# Patient Record
(demographics unavailable — no encounter records)

---

## 2025-03-11 NOTE — HISTORY OF PRESENT ILLNESS
[de-identified] : 70 year old male presents today for initial consultation for elevated ferritin, referred by Dr. Lin.  Labs dated 2024 shows a ferritin of 849, total iron 118, iron sat 41%.  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      Ferritin Levels: 2024- 849 2023- 534 2022-   Social Hx- Social ETOH use  Former smoker- quit 8 years ago, smoke 1 PPD x 45 years Denies current illicit drug use Retired  Maried with 2 adopted children   Family Hx- Father with met RCC  in his 70's  Health Maintence- [facility]-  Prostate exam 2024  [de-identified] : Patient is here for follow up for HFE, elevated ferritin. Patient overall feels well except for some minor fatigue.

## 2025-03-11 NOTE — CONSULT LETTER
[Dear  ___] : Dear  [unfilled], [Consult Letter:] : I had the pleasure of evaluating your patient, [unfilled]. [Please see my note below.] : Please see my note below. [Consult Closing:] : Thank you very much for allowing me to participate in the care of this patient.  If you have any questions, please do not hesitate to contact me. [Sincerely,] : Sincerely, [FreeTextEntry3] : Joan Nettles MD [facility]

## 2025-03-11 NOTE — ASSESSMENT
[Patient/Caregiver unclear of wishes] : Patient/Caregiver unclear of wishes [FreeTextEntry1] : 70-year-old male presents today for initial consultation for elevated ferritin, referred by Dr. Lin.  Labs dated Jan 2024 shows a ferritin of 849, total iron 118, iron sat 41%.  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  Reviewed with patient hemochromatosis, increased iron absorption but usually not with H63D heterozygote mutation. Patient admits to 3-4 drinks per day, no previous imaging of the liver.  MRI to evaluate iron stores ordered.   #The patient was offered screening CT scan in view of history of smoking. The patient participated in shared- decision making session where the benefits, limitations and potential harms were discussed.  The patient was determined to be eligible for screening CT based on age, calculation of smoking pack- years.  The patient was informed of the importance of adherence to annual screening, impact of comorbidities. The patient has no sign of lung cancer.  The patient is over 30ppy. The patient is a former smoker.  Up to 4 mm lung nodules.   Small cluster of left lower lobe tree-in-bud nodules compatible with small airway disease of likely infectious or inflammatory etiologies.  # Liver iron stores elevated, Ferritin 721- schedule phlebotomy x 4 Reviewed MRI - Hepatic Iron concentration: 42 ?mol/g or 2.3 mg/g, compatible with mild iron deposition. No steatosis. Reviewed with patient to decrease ETOH intake.   Ferritin 481- phlebotomy x 2 - rpt labs in 6 weeks   case and mgmt discussed with Dr. Nettles  [AdvancecareDate] : 03/12/2024

## 2025-03-11 NOTE — HISTORY OF PRESENT ILLNESS
[de-identified] : 70 year old male presents today for initial consultation for elevated ferritin, referred by Dr. Lin.  Labs dated 2024 shows a ferritin of 849, total iron 118, iron sat 41%.  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      Ferritin Levels: 2024- 849 2023- 534 2022-   Social Hx- Social ETOH use  Former smoker- quit 8 years ago, smoke 1 PPD x 45 years Denies current illicit drug use Retired  Maried with 2 adopted children   Family Hx- Father with met RCC  in his 70's  Health Maintence- [facility]-  Prostate exam 2024  [de-identified] : Patient is here for follow up for HFE, elevated ferritin. Patient overall feels well except for some minor fatigue.

## 2025-03-11 NOTE — CONSULT LETTER
[Dear  ___] : Dear  [unfilled], [Consult Letter:] : I had the pleasure of evaluating your patient, [unfilled]. [Please see my note below.] : Please see my note below. [Consult Closing:] : Thank you very much for allowing me to participate in the care of this patient.  If you have any questions, please do not hesitate to contact me. [Sincerely,] : Sincerely, [FreeTextEntry3] : Joan Nettles MD [facility]

## 2025-03-11 NOTE — CONSULT LETTER
[Dear  ___] : Dear  [unfilled], [Consult Letter:] : I had the pleasure of evaluating your patient, [unfilled]. [Please see my note below.] : Please see my note below. [Consult Closing:] : Thank you very much for allowing me to participate in the care of this patient.  If you have any questions, please do not hesitate to contact me. [Sincerely,] : Sincerely, [FreeTextEntry3] : Joan Nettles MD [facility]

## 2025-03-11 NOTE — HISTORY OF PRESENT ILLNESS
[de-identified] : 70 year old male presents today for initial consultation for elevated ferritin, referred by Dr. Lin.  Labs dated 2024 shows a ferritin of 849, total iron 118, iron sat 41%.  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      Ferritin Levels: 2024- 849 2023- 534 2022-   Social Hx- Social ETOH use  Former smoker- quit 8 years ago, smoke 1 PPD x 45 years Denies current illicit drug use Retired  Maried with 2 adopted children   Family Hx- Father with met RCC  in his 70's  Health Maintence- [facility]-  Prostate exam 2024  [de-identified] : Patient is here for follow up for HFE, elevated ferritin. Patient overall feels well except for some minor fatigue.

## 2025-04-22 NOTE — CONSULT LETTER
[Dear  ___] : Dear  [unfilled], [Consult Letter:] : I had the pleasure of evaluating your patient, [unfilled]. [Please see my note below.] : Please see my note below. [Consult Closing:] : Thank you very much for allowing me to participate in the care of this patient.  If you have any questions, please do not hesitate to contact me. [Sincerely,] : Sincerely, [FreeTextEntry3] : Joan Nettles MD [facility]

## 2025-04-22 NOTE — ASSESSMENT
[FreeTextEntry1] : 70-year-old male presents today for initial consultation for elevated ferritin, referred by Dr. Lin.  Labs dated Jan 2024 shows a ferritin of 849, total iron 118, iron sat 41%.  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  Reviewed with patient hemochromatosis, increased iron absorption but usually not with H63D heterozygote mutation. Patient admits to 3-4 drinks per day, no previous imaging of the liver.  MRI to evaluate iron stores ordered.   #The patient was offered screening CT scan in view of history of smoking. The patient participated in shared- decision making session where the benefits, limitations and potential harms were discussed.  The patient was determined to be eligible for screening CT based on age, calculation of smoking pack- years.  The patient was informed of the importance of adherence to annual screening, impact of comorbidities. The patient has no sign of lung cancer.  The patient is over 30ppy. The patient is a former smoker.  Up to 4 mm lung nodules.   Small cluster of left lower lobe tree-in-bud nodules compatible with small airway disease of likely infectious or inflammatory etiologies.  # Liver iron stores elevated, Ferritin 721- schedule phlebotomy x 4 Reviewed MRI - Hepatic Iron concentration: 42 ?mol/g or 2.3 mg/g, compatible with mild iron deposition. No steatosis. Reviewed with patient to decrease ETOH intake.   Ferritin 481- s/p phlebotomy x 2 hgb decline 13.3 check ferritin today   case and mgmt discussed with Dr. Nettles- return in 3 months - reg labs and irons  [Patient/Caregiver unclear of wishes] : Patient/Caregiver unclear of wishes [AdvancecareDate] : 03/12/2024

## 2025-04-22 NOTE — HISTORY OF PRESENT ILLNESS
[de-identified] : 70 year old male presents today for initial consultation for elevated ferritin, referred by Dr. Lin.  Labs dated 2024 shows a ferritin of 849, total iron 118, iron sat 41%.  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      Ferritin Levels: 2024- 849 2023- 534 2022-   Social Hx- Social ETOH use  Former smoker- quit 8 years ago, smoke 1 PPD x 45 years Denies current illicit drug use Retired  Maried with 2 adopted children   Family Hx- Father with met RCC  in his 70's  Health Maintence- [facility]-  Prostate exam 2024  [de-identified] : Patient is here for follow up for HFE, elevated ferritin. Patient overall feels well except for some minor fatigue. S/p phlebotomy x 2

## 2025-07-21 NOTE — BEGINNING OF VISIT
[0] : 2) Feeling down, depressed, or hopeless: Not at all (0) [PHQ-2 Negative] : PHQ-2 Negative [Former] : Former [20 or more] : 20 or more [< 15 Years] : < 15 Years [Date Discussed (MM/DD/YY): ___] : Discussed: [unfilled] [Patient/Caregiver unclear of wishes] : Patient/Caregiver unclear of wishes [[id number]] : 0

## 2025-07-21 NOTE — HISTORY OF PRESENT ILLNESS
[de-identified] : 70 year old male presents today for initial consultation for elevated ferritin, referred by Dr. Lin.  Labs dated 2024 shows a ferritin of 849, total iron 118, iron sat 41%.  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      Ferritin Levels: 2024- 849 2023- 534 2022-   Social Hx- Social ETOH use  Former smoker- quit 8 years ago, smoke 1 PPD x 45 years Denies current illicit drug use Retired  Maried with 2 adopted children   Family Hx- Father with met RCC  in his 70's  Health Maintence- [facility]-  Prostate exam 2024  [de-identified] : Patient is here for follow up for HFE, elevated ferritin.  Patient overall feels well except for some minor fatigue.  Last TP was 2-3 months ago Lab work performed here on 7/7/25, discussing results today

## 2025-07-21 NOTE — CONSULT LETTER
[Dear  ___] : Dear  [unfilled], [Consult Letter:] : I had the pleasure of evaluating your patient, [unfilled]. [Please see my note below.] : Please see my note below. [Consult Closing:] : Thank you very much for allowing me to participate in the care of this patient.  If you have any questions, please do not hesitate to contact me. [Sincerely,] : Sincerely, [FreeTextEntry3] : Joan Nettles MD [facility]

## 2025-07-21 NOTE — ASSESSMENT
[Patient/Caregiver unclear of wishes] : Patient/Caregiver unclear of wishes [Full Code] : full code [FreeTextEntry1] : 70-year-old male presents today for initial consultation for elevated ferritin, referred by Dr. Lin.  Labs dated Jan 2024 shows a ferritin of 849, total iron 118, iron sat 41%.  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  Reviewed with patient hemochromatosis, increased iron absorption but usually not with H63D heterozygote mutation. Patient admits to 3-4 drinks per day, no previous imaging of the liver.  MRI to evaluate iron stores ordered.   #The patient was offered screening CT scan in view of history of smoking. The patient participated in shared- decision making session where the benefits, limitations and potential harms were discussed.  The patient was determined to be eligible for screening CT based on age, calculation of smoking pack- years.  The patient was informed of the importance of adherence to annual screening, impact of comorbidities. The patient has no sign of lung cancer.  The patient is over 30ppy. The patient is a former smoker.  Up to 4 mm lung nodules.   Small cluster of left lower lobe tree-in-bud nodules compatible with small airway disease of likely infectious or inflammatory etiologies. - due for CT chest   # Liver iron stores elevated, Ferritin 721- schedule phlebotomy x 4 Reviewed MRI - Hepatic Iron concentration: 42 ?mol/g or 2.3 mg/g, compatible with mild iron deposition. No steatosis. Reviewed with patient to decrease ETOH intake.  Dexa ordered Ferritin 481> 140 Phlebotomy today  return in 3 months - reg labs and irons  [AdvancecareDate] : 07/21/25

## 2025-07-21 NOTE — CONSULT LETTER
[Dear  ___] : Dear  [unfilled], [Consult Letter:] : I had the pleasure of evaluating your patient, [unfilled]. [Please see my note below.] : Please see my note below. [Consult Closing:] : Thank you very much for allowing me to participate in the care of this patient.  If you have any questions, please do not hesitate to contact me. [Sincerely,] : Sincerely, [FreeTextEntry3] : Joan Nettles MD [facility]

## 2025-07-21 NOTE — CONSULT LETTER
[Dear  ___] : Dear  [unfilled], [Consult Letter:] : I had the pleasure of evaluating your patient, [unfilled]. [Please see my note below.] : Please see my note below. [Consult Closing:] : Thank you very much for allowing me to participate in the care of this patient.  If you have any questions, please do not hesitate to contact me. [Sincerely,] : Sincerely, [FreeTextEntry3] : Joan Nettles MD [facility]

## 2025-07-21 NOTE — BEGINNING OF VISIT
[0] : 2) Feeling down, depressed, or hopeless: Not at all (0) [PHQ-2 Negative] : PHQ-2 Negative [Former] : Former [20 or more] : 20 or more [< 15 Years] : < 15 Years [Date Discussed (MM/DD/YY): ___] : Discussed: [unfilled] [Patient/Caregiver unclear of wishes] : Patient/Caregiver unclear of wishes [[id number]] : 0

## 2025-07-21 NOTE — HISTORY OF PRESENT ILLNESS
[de-identified] : 70 year old male presents today for initial consultation for elevated ferritin, referred by Dr. Lin.  Labs dated 2024 shows a ferritin of 849, total iron 118, iron sat 41%.  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      Ferritin Levels: 2024- 849 2023- 534 2022-   Social Hx- Social ETOH use  Former smoker- quit 8 years ago, smoke 1 PPD x 45 years Denies current illicit drug use Retired  Maried with 2 adopted children   Family Hx- Father with met RCC  in his 70's  Health Maintence- [facility]-  Prostate exam 2024  [de-identified] : Patient is here for follow up for HFE, elevated ferritin.  Patient overall feels well except for some minor fatigue.  Last TP was 2-3 months ago Lab work performed here on 7/7/25, discussing results today

## 2025-07-21 NOTE — HISTORY OF PRESENT ILLNESS
[de-identified] : 70 year old male presents today for initial consultation for elevated ferritin, referred by Dr. Lin.  Labs dated 2024 shows a ferritin of 849, total iron 118, iron sat 41%.  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      Ferritin Levels: 2024- 849 2023- 534 2022-   Social Hx- Social ETOH use  Former smoker- quit 8 years ago, smoke 1 PPD x 45 years Denies current illicit drug use Retired  Maried with 2 adopted children   Family Hx- Father with met RCC  in his 70's  Health Maintence- [facility]-  Prostate exam 2024  [de-identified] : Patient is here for follow up for HFE, elevated ferritin.  Patient overall feels well except for some minor fatigue.  Last TP was 2-3 months ago Lab work performed here on 7/7/25, discussing results today

## 2025-07-21 NOTE — BEGINNING OF VISIT
[0] : 2) Feeling down, depressed, or hopeless: Not at all (0) [PHQ-2 Negative] : PHQ-2 Negative [Former] : Former [20 or more] : 20 or more [< 15 Years] : < 15 Years [Date Discussed (MM/DD/YY): ___] : Discussed: [unfilled] [Patient/Caregiver unclear of wishes] : Patient/Caregiver unclear of wishes [[id number]] : 0

## 2025-07-21 NOTE — HISTORY OF PRESENT ILLNESS
[de-identified] : 70 year old male presents today for initial consultation for elevated ferritin, referred by Dr. Lin.  Labs dated 2024 shows a ferritin of 849, total iron 118, iron sat 41%.  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      Ferritin Levels: 2024- 849 2023- 534 2022-   Social Hx- Social ETOH use  Former smoker- quit 8 years ago, smoke 1 PPD x 45 years Denies current illicit drug use Retired  Maried with 2 adopted children   Family Hx- Father with met RCC  in his 70's  Health Maintence- [facility]-  Prostate exam 2024  [de-identified] : Patient is here for follow up for HFE, elevated ferritin.  Patient overall feels well except for some minor fatigue.  Last TP was 2-3 months ago Lab work performed here on 7/7/25, discussing results today

## 2025-07-21 NOTE — CONSULT LETTER
[Dear  ___] : Dear  [unfilled], [Consult Letter:] : I had the pleasure of evaluating your patient, [unfilled]. [Please see my note below.] : Please see my note below. [Consult Closing:] : Thank you very much for allowing me to participate in the care of this patient.  If you have any questions, please do not hesitate to contact me. [Sincerely,] : Sincerely, [FreeTextEntry3] : Joan Nettles MD [facility]

## 2025-07-21 NOTE — BEGINNING OF VISIT
[0] : 2) Feeling down, depressed, or hopeless: Not at all (0) [PHQ-2 Negative] : PHQ-2 Negative [Former] : Former [20 or more] : 20 or more [< 15 Years] : < 15 Years [Date Discussed (MM/DD/YY): ___] : Discussed: [unfilled] [Patient/Caregiver unclear of wishes] : Patient/Caregiver unclear of wishes [[id number]] : 0

## 2025-07-21 NOTE — HISTORY OF PRESENT ILLNESS
[de-identified] : 70 year old male presents today for initial consultation for elevated ferritin, referred by Dr. Lin.  Labs dated 2024 shows a ferritin of 849, total iron 118, iron sat 41%.  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      Ferritin Levels: 2024- 849 2023- 534 2022-   Social Hx- Social ETOH use  Former smoker- quit 8 years ago, smoke 1 PPD x 45 years Denies current illicit drug use Retired  Maried with 2 adopted children   Family Hx- Father with met RCC  in his 70's  Health Maintence- [facility]-  Prostate exam 2024  [de-identified] : Patient is here for follow up for HFE, elevated ferritin.  Patient overall feels well except for some minor fatigue.  Last TP was 2-3 months ago Lab work performed here on 7/7/25, discussing results today

## 2025-07-21 NOTE — BEGINNING OF VISIT
[0] : 2) Feeling down, depressed, or hopeless: Not at all (0) [PHQ-2 Negative] : PHQ-2 Negative [Former] : Former [20 or more] : 20 or more [< 15 Years] : < 15 Years [Date Discussed (MM/DD/YY): ___] : Discussed: [unfilled] [Patient/Caregiver unclear of wishes] : Patient/Caregiver unclear of wishes [[id number]] : 0

## 2025-07-21 NOTE — CONSULT LETTER
[Dear  ___] : Dear  [unfilled], [Consult Letter:] : I had the pleasure of evaluating your patient, [unfilled]. [Please see my note below.] : Please see my note below. [Consult Closing:] : Thank you very much for allowing me to participate in the care of this patient.  If you have any questions, please do not hesitate to contact me. [Sincerely,] : Sincerely, [FreeTextEntry3] : Joan Nettles MD [facility]

## 2025-07-28 NOTE — HISTORY OF PRESENT ILLNESS
[FreeTextEntry1] : CPE  [de-identified] : used to live in 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

## 2025-07-28 NOTE — HEALTH RISK ASSESSMENT
[Very Good] : ~his/her~  mood as very good [2 - 4 times a month (2 pts)] : 2-4 times a month (2 points) [1 or 2 (0 pts)] : 1 or 2 (0 points) [Never (0 pts)] : Never (0 points) [No] : In the past 12 months have you used drugs other than those required for medical reasons? No [No falls in past year] : Patient reported no falls in the past year [Little interest or pleasure doing things] : 1) Little interest or pleasure doing things [Feeling down, depressed, or hopeless] : 2) Feeling down, depressed, or hopeless [0] : 2) Feeling down, depressed, or hopeless: Not at all (0) [PHQ-2 Negative - No further assessment needed] : PHQ-2 Negative - No further assessment needed [Yes] : Reviewed medication list for presence of high-risk medications. [Former] : Former [< 15 Years] : < 15 Years [Patient reported colonoscopy was normal] : Patient reported colonoscopy was normal [None] : None [With Family] : lives with family [# of Members in Household ___] :  household currently consist of [unfilled] member(s) [] :  [# Of Children ___] : has [unfilled] children [Sexually Active] : sexually active [Feels Safe at Home] : Feels safe at home [Fully functional (bathing, dressing, toileting, transferring, walking, feeding)] : Fully functional (bathing, dressing, toileting, transferring, walking, feeding) [Fully functional (using the telephone, shopping, preparing meals, housekeeping, doing laundry, using] : Fully functional and needs no help or supervision to perform IADLs (using the telephone, shopping, preparing meals, housekeeping, doing laundry, using transportation, managing medications and managing finances) [Reports normal functional visual acuity (ie: able to read med bottle)] : Reports normal functional visual acuity [Smoke Detector] : smoke detector [Carbon Monoxide Detector] : carbon monoxide detector [Safety elements used in home] : safety elements used in home [Seat Belt] :  uses seat belt [Sunscreen] : uses sunscreen [Time Spent: ___ Minutes] : I spent [unfilled] minutes performing a depression screening for this patient. [Blood Thinners] : blood thinners [20 or more] : 20 or more [2] : 2 [Audit-CScore] : 2 [de-identified] : plays golf every day [de-identified] : Low carbs, no sugar, no red meat  [BCI2Fopze] : 0 [LowDoseCTScan] : 02/24 [Change in mental status noted] : No change in mental status noted [Language] : denies difficulty with language [Behavior] : denies difficulty with behavior [Learning/Retaining New Information] : denies difficulty learning/retaining new information [Handling Complex Tasks] : denies difficulty handling complex tasks [Reasoning] : denies difficulty with reasoning [Spatial Ability and Orientation] : denies difficulty with spatial ability and orientation [Reports changes in hearing] : Reports no changes in hearing [Reports changes in vision] : Reports no changes in vision [Reports changes in dental health] : Reports no changes in dental health [Travel to Developing Areas] : does not  travel to developing areas [TB Exposure] : is not being exposed to tuberculosis [BoneDensityComments] : Ordered [ColonoscopyDate] : 01/2022 [FreeTextEntry2] :   [de-identified] : Last vision exam 02/2025 [de-identified] : Last dental visit 04/2025